# Patient Record
Sex: MALE | Race: WHITE | Employment: FULL TIME | URBAN - METROPOLITAN AREA
[De-identification: names, ages, dates, MRNs, and addresses within clinical notes are randomized per-mention and may not be internally consistent; named-entity substitution may affect disease eponyms.]

---

## 2017-01-04 ENCOUNTER — HOSPITAL ENCOUNTER (OUTPATIENT)
Dept: PHYSICAL THERAPY | Age: 32
Discharge: HOME | End: 2017-01-04
Payer: COMMERCIAL

## 2017-01-04 PROCEDURE — 97530 THERAPEUTIC ACTIVITIES: CPT

## 2017-01-04 PROCEDURE — 97110 THERAPEUTIC EXERCISES: CPT

## 2017-01-04 NOTE — PROGRESS NOTES
PHYSICAL THERAPY - DAILY TREATMENT NOTE    Patient Name: Jose Luis Flores        Date: 2017  : 1985   yes Patient  Verified  Visit #:     Insurance: Payor: BLUE CROSS / Plan: [employer] [facility] [place] / Product Type: PPO /      In time: 440 Out time: 540   Total Treatment Time: 60   TREATMENT AREA =  Left knee pain [M25.562]    SUBJECTIVE  Pain Level (on 0 to 10 scale):  0  / 10   Medication Changes/New allergies or changes in medical history, any new surgeries or procedures?    no  If yes, update Summary List   Subjective Functional Status/Changes:  []  No changes reported     \"I havent had p! in 2 weeks, it feels good. I did get sick last week, and had a break since the holidays.  I guess I got weaker\"         OBJECTIVE  35 min Therapeutic Exercise:  [x]  See flow sheet   Rationale:      increase ROM, increase strength, improve coordination, improve balance and increase proprioception to improve the patients ability to  perform ADLs/prolong stding and amb/stairs, and return to PLOF and avocational activities     25 min Therapeutic Activity: wall squats to 45 degs, squats to 45 with B LE and SL on TG, SLS on foam x 30\"   HRs for push off and TRs for ankle HS during gait  stair training, pull/push 50# x 60' x 4   Rationale:    increase ROM, increase strength, improve coordination, improve balance and increase proprioception to improve the patients ability to  perform ADLs/prolong stding and amb/stairs with ease          min Patient Education:  yes  Reviewed HEP   []  Progressed/Changed HEP based on:  Pt ed on importance and benefits of compliance with HEP, core strength/stability and proper posture; pt verbalized understanding         Other Objective/Functional Measures:    VCs + demo to perform proper technique for TE  initiated foam to SLS, and hold with wall squat to 45 degs with GTB without p!  pt demos decrease quad activation and L LE shaking with bosu step up; held at this time  L knee flex AROM=141 degs  demos good form with bridges     Post Treatment Pain Level (on 0 to 10) scale:   0  / 10     ASSESSMENT  Assessment/Changes in Function:   See PN    Progressed there-ex without c/o increase p!  demos proper 45 degs wall squat   []  See Progress Note/Recertification   Patient will continue to benefit from skilled PT services to modify and progress therapeutic interventions, address functional mobility deficits, address ROM deficits, address strength deficits, analyze and address soft tissue restrictions, analyze and cue movement patterns, analyze and modify body mechanics/ergonomics, assess and modify postural abnormalities and instruct in home and community integration to attain remaining goals. Progress toward goals / Updated goals:  See PN    · Short Term Goals: To be accomplished in 3 weeks:  1. Patient will be Ind with HEP for home management of symptoms. MET  2. Patient will report decreased pain to 2/10 at worst to improve ability to perform ADLs. MET  3. Patient will improve L knee AROM to 0-135 degrees to improve ability to perform functional transfers. MET, L knee AROM tzai=512 degs  · Long Term Goals: To be accomplished in 12 weeks:  1. Patient will improve FOTO score by 20 points to indicate improved functional ability  2. Patient will achieve Hop test to >/= 75% of RLE.   3. Patient will be able to run for 10 minutes without pain to improve ability to perform recreational activities     PLAN  []  Upgrade activities as tolerated yes Continue plan of care   []  Discharge due to :    []  Other:      Therapist: Nav De La Garza PTA    Date: 1/4/2017 Time: 5:17 PM     Future Appointments  Date Time Provider Sarahy Lopez   1/11/2017 3:00 PM [facility]   1/13/2017 11:30 AM [facility]   1/18/2017 4:30 PM [facility]   1/25/2017 3:00 PM [facility]   1/27/2017 11:30 AM [facility]

## 2017-01-04 NOTE — PROGRESS NOTES
[address] [facility] PHYSICAL THERAPY  [address] [place] [address] Reuben Norwood, [address] - Phone: [phone]  Fax: [phone]  PROGRESS NOTE  Patient Name: Jojo Newton : 1985   Treatment/Medical Diagnosis: Left knee pain [M25.562]   Referral Source: New Hernandez MD     Date of Initial Visit: 16 Attended Visits: 7 Missed Visits: 0     SUMMARY OF TREATMENT  Pt's rx consist of an active warm up on bike, LE strengthening/stability/stretching TE, proprioception/balance TE, stair training, and instruction on HEP + proper body mechanics  CURRENT STATUS  Pt has made excellent progress as evidence by achieving all STGs. Pt reports compliance + Larue with HEP, performs all there-ex p! free, and reports p! free x 2 weeks. Pt able to perform 45 degs squat with good quad activation. Pt's L knee 0-141 degs AROM (IE=0-125 degs). Goal/Measure of Progress Goal Met? 1. Patient will be Ind with HEP for home management of symptoms. MET    Status at last Eval: establish HEP  Current Status: (I) with HEP yes   2. Patient will report decreased pain to 2/10 at worst to improve ability to perform ADLs. Status at last Eval: >2/10 Current Status: p! free x 2 weeks yes   3. Patient will improve L knee AROM to 0-135 degrees to improve ability to perform functional transfers. Status at last Eval: L knee AROM flex=125 degs Current Status: L knee AROM flex=141 degs yes     New Goals to be achieved in __8__  weeks:  1. Patient will improve FOTO score by 20 points to indicate improved functional ability  2. Patient will achieve Hop test to >/= 75% of RLE. 3. Patient will be able to run for 10 minutes without pain to improve ability to perform recreational activities    RECOMMENDATIONS  Pt will benefit from further skilled PT services 2x wk x 4-8 wks to increase strength/stability to promote functional mobility and return to PLOF.     If you have any questions/comments please contact us directly at [phone]. Thank you for allowing us to assist in the care of your patient. LPTA Signature: Sharyle Mosher, PTA  Date: 1/4/2017   PT Signature: Carlota Gottron, DPT Time: 6:03 PM   NOTE TO PHYSICIAN:  PLEASE COMPLETE THE ORDERS BELOW AND FAX TO   [facility] [facility]: [phone]. If you are unable to process this request in 24 hours please contact our office: [phone].    ___ I have read the above report and request that my patient continue as recommended.   ___ I have read the above report and request that my patient continue therapy with the following changes/special instructions:_________________________________________________________   ___ I have read the above report and request that my patient be discharged from therapy.      Physician Signature:        Date:       Time:

## 2017-01-11 ENCOUNTER — HOSPITAL ENCOUNTER (OUTPATIENT)
Dept: PHYSICAL THERAPY | Age: 32
Discharge: HOME | End: 2017-01-11
Payer: COMMERCIAL

## 2017-01-11 PROCEDURE — 97110 THERAPEUTIC EXERCISES: CPT

## 2017-01-11 PROCEDURE — 97530 THERAPEUTIC ACTIVITIES: CPT

## 2017-01-13 ENCOUNTER — HOSPITAL ENCOUNTER (OUTPATIENT)
Dept: PHYSICAL THERAPY | Age: 32
Discharge: HOME | End: 2017-01-13
Payer: COMMERCIAL

## 2017-01-13 PROCEDURE — 97110 THERAPEUTIC EXERCISES: CPT

## 2017-01-13 PROCEDURE — 97530 THERAPEUTIC ACTIVITIES: CPT

## 2017-01-13 NOTE — PROGRESS NOTES
PHYSICAL THERAPY - DAILY TREATMENT NOTE    Patient Name: Matthew Bailey        Date: 2017  : 1985   yes Patient  Verified  Visit #:     Insurance: Payor: BLUE CROSS / Plan: [facility] / Product Type: PPO /      In time: 1130 Out time: 1225   Total Treatment Time: 55   TREATMENT AREA =  Left knee pain [M25.562]    SUBJECTIVE  Pain Level (on 0 to 10 scale):  0  / 10   Medication Changes/New allergies or changes in medical history, any new surgeries or procedures?    no  If yes, update Summary List   Subjective Functional Status/Changes:  []  No changes reported     \"I feel good today, I was a little sore after last time\"       OBJECTIVE  40 min Therapeutic Exercise:  [x]  See flow sheet   Rationale:      increase ROM, increase strength, improve coordination, improve balance and increase proprioception to improve the patients ability to  perform ADLs/prolong stding and amb/stairs, and return to PLOF and avocational activities     15 min Therapeutic Activity:  squats, bosu steps ups, and lunges,      Rationale:    increase ROM, increase strength, improve coordination, improve balance and increase proprioception to improve the patients ability to  perform ADLs/prolong stding and amb/stairs with ease          min Patient Education:  yes  Reviewed HEP   []  Progressed/Changed HEP based on:  Pt ed on importance and benefits of compliance with HEP, core strength/stability and proper posture; pt verbalized understanding     Other Objective/Functional Measures:     VCs + demo to perform proper technique for TE  initiated  bosu lunge, and stool scoot without c/o p!  demos decrease knee flex with lunge due to decrease strength    demos good+ bridge form with SB bridges    demos good quad activation with step ups ecc lowering on bosu   Post Treatment Pain Level (on 0 to 10) scale:   0  / 10     ASSESSMENT  Assessment/Changes in Function:     Progressed there-ex without c/o increase p!      [] See Progress Note/Recertification   Patient will continue to benefit from skilled PT services to modify and progress therapeutic interventions, address functional mobility deficits, address ROM deficits, address strength deficits, analyze and address soft tissue restrictions, analyze and cue movement patterns, analyze and modify body mechanics/ergonomics, assess and modify postural abnormalities and instruct in home and community integration to attain remaining goals. Progress toward goals / Updated goals:  See PN last visit    · Short Term Goals: To be accomplished in 3 weeks:  1. Patient will be Ind with HEP for home management of symptoms. MET  2. Patient will report decreased pain to 2/10 at worst to improve ability to perform ADLs. MET  3. Patient will improve L knee AROM to 0-135 degrees to improve ability to perform functional transfers. MET, L knee AROM jhic=999 degs  · Long Term Goals: To be accomplished in 12 weeks:  1. Patient will improve FOTO score by 20 points to indicate improved functional ability  2. Patient will achieve Hop test to >/= 75% of RLE.   3. Patient will be able to run for 10 minutes without pain to improve ability to perform recreational activities     PLAN  []  Upgrade activities as tolerated yes Continue plan of care   []  Discharge due to :    []  Other:      Therapist: Dave Puentes PTA    Date: 1/13/2017 Time: 3:45 PM     Future Appointments  Date Time Provider Sarahy Lopez   1/18/2017 4:30 PM [facility]   1/25/2017 3:00 PM [facility]   1/27/2017 11:30 AM Guanaco Melchor [facility]

## 2017-01-18 ENCOUNTER — HOSPITAL ENCOUNTER (OUTPATIENT)
Dept: PHYSICAL THERAPY | Age: 32
Discharge: HOME | End: 2017-01-18
Payer: COMMERCIAL

## 2017-01-18 PROCEDURE — 97110 THERAPEUTIC EXERCISES: CPT

## 2017-01-18 PROCEDURE — 97530 THERAPEUTIC ACTIVITIES: CPT

## 2017-01-18 NOTE — PROGRESS NOTES
PHYSICAL THERAPY - DAILY TREATMENT NOTE    Patient Name: Luz Huang        Date: 2017  : 1985   yes Patient  Verified  Visit #:   10 of   20  Insurance: Payor: Delphine Macias / Plan: [address] [facility] [employer] / Product Type: PPO /      In time: 430 Out time: 525   Total Treatment Time: 55   TREATMENT AREA =  Left knee pain [M25.562]    SUBJECTIVE  Pain Level (on 0 to 10 scale):  0  / 10   Medication Changes/New allergies or changes in medical history, any new surgeries or procedures?    no  If yes, update Summary List   Subjective Functional Status/Changes:  []  No changes reported     \"I been doing my butt burners and It feels good\"       OBJECTIVE  40 min Therapeutic Exercise:  [x]  See flow sheet   Rationale:      increase ROM, increase strength, improve coordination, improve balance and increase proprioception to improve the patients ability to  perform ADLs/prolong stding and amb/stairs, and return to PLOF and avocational activities     15 min Therapeutic Activity:  squats, bosu steps ups, and lunges      Rationale:    increase ROM, increase strength, improve coordination, improve balance and increase proprioception to improve the patients ability to  perform ADLs/prolong stding and amb/stairs with ease          min Patient Education:  yes  Reviewed HEP   []  Progressed/Changed HEP based on:  Pt ed on importance and benefits of compliance with HEP, core strength/stability and proper posture; pt verbalized understanding     Other Objective/Functional Measures:     VCs + demo to perform proper technique for TE  initiated  bosu squats, prone knee flex, inv HS and added 2# to butt burners without c/o p!    attempted tap downs on 4\", pt unable due to decrease strength and p! Post Treatment Pain Level (on 0 to 10) scale:   0  / 10     ASSESSMENT  Assessment/Changes in Function:   demos good quad activation with squats  Progressed there-ex without c/o increase p!      []  See Progress Note/Recertification   Patient will continue to benefit from skilled PT services to modify and progress therapeutic interventions, address functional mobility deficits, address ROM deficits, address strength deficits, analyze and address soft tissue restrictions, analyze and cue movement patterns, analyze and modify body mechanics/ergonomics, assess and modify postural abnormalities and instruct in home and community integration to attain remaining goals. Progress toward goals / Updated goals:  Short Term Goals: To be accomplished in 3 weeks:  1. Patient will be Ind with HEP for home management of symptoms. MET  2. Patient will report decreased pain to 2/10 at worst to improve ability to perform ADLs. MET  3. Patient will improve L knee AROM to 0-135 degrees to improve ability to perform functional transfers. MET, L knee AROM aoqg=919 degs  · Long Term Goals: To be accomplished in 12 weeks:  1. Patient will improve FOTO score by 20 points to indicate improved functional ability  2. Patient will achieve Hop test to >/= 75% of RLE.   3. Patient will be able to run for 10 minutes without pain to improve ability to perform recreational activities     PLAN  []  Upgrade activities as tolerated yes Continue plan of care   []  Discharge due to :    []  Other:      Therapist: Marylou Boyle PTA    Date: 1/18/2017 Time: 5:18 PM     Future Appointments  Date Time Provider Sarahy Lpoez   1/25/2017 3:00 PM [facility]   1/27/2017 11:30 AM Serafin Naik [facility]

## 2017-01-25 ENCOUNTER — APPOINTMENT (OUTPATIENT)
Dept: PHYSICAL THERAPY | Age: 32
End: 2017-01-25
Payer: COMMERCIAL

## 2017-01-27 ENCOUNTER — HOSPITAL ENCOUNTER (OUTPATIENT)
Dept: PHYSICAL THERAPY | Age: 32
Discharge: HOME | End: 2017-01-27
Payer: COMMERCIAL

## 2017-01-27 PROCEDURE — 97110 THERAPEUTIC EXERCISES: CPT

## 2017-01-27 PROCEDURE — 97112 NEUROMUSCULAR REEDUCATION: CPT

## 2017-02-01 ENCOUNTER — HOSPITAL ENCOUNTER (OUTPATIENT)
Dept: PHYSICAL THERAPY | Age: 32
Discharge: HOME | End: 2017-02-01
Payer: COMMERCIAL

## 2017-02-01 PROCEDURE — 97110 THERAPEUTIC EXERCISES: CPT

## 2017-02-01 PROCEDURE — 97530 THERAPEUTIC ACTIVITIES: CPT

## 2017-02-01 NOTE — PROGRESS NOTES
PHYSICAL THERAPY - DAILY TREATMENT NOTE    Patient Name: Trish Marshall        Date: 2017  : 1985   yes Patient  Verified  Visit #:     Insurance: Payor: Gerardo Chester / Plan: [facility] / Product Type: PPO /      In time: 430 Out time: 525   Total Treatment Time: 55   TREATMENT AREA =  Left knee pain [M25.562]    SUBJECTIVE  Pain Level (on 0 to 10 scale):  0  / 10   Medication Changes/New allergies or changes in medical history, any new surgeries or procedures?    no  If yes, update Summary List   Subjective Functional Status/Changes:  []  No changes reported     \"I read doing research that I need to strengthen my HS and keep them as strong as my quads\"       OBJECTIVE  45 min Therapeutic Exercise:  [x]  See flow sheet   Rationale:      increase ROM, increase strength, improve coordination, improve balance and increase proprioception to improve the patients ability to  perform ADLs/prolong stding and amb/stairs, and return to PLOF and avocational activities     10 min Therapeutic Activity:  squats  stair stepper    Rationale:    increase ROM, increase strength, improve coordination, improve balance and increase proprioception to improve the patients ability to  perform ADLs/prolong stding and amb/stairs with ease          min Patient Education:  yes  Reviewed HEP   []  Progressed/Changed HEP based on:  Pt ed on importance and benefits of compliance with HEP, core strength/stability and proper posture; pt verbalized understanding     Other Objective/Functional Measures:     VCs + demo to perform proper technique for TE  initiated WGS #1, #2, big X littl X, and SL stool scoot without c/o p!  demos quad fatigue with 4\" tap down  SLS without LOB  demos 90% AROM  L SL HR    Post Treatment Pain Level (on 0 to 10) scale:   0  / 10     ASSESSMENT  Assessment/Changes in Function:   demos proper wall squat at 90 degs x 30\">40\"  Progressed there-ex without c/o increase p!      []  See Progress Note/Recertification   Patient will continue to benefit from skilled PT services to modify and progress therapeutic interventions, address functional mobility deficits, address ROM deficits, address strength deficits, analyze and address soft tissue restrictions, analyze and cue movement patterns, analyze and modify body mechanics/ergonomics, assess and modify postural abnormalities and instruct in home and community integration to attain remaining goals. Progress toward goals / Updated goals:  Short Term Goals: To be accomplished in 3 weeks:  1. Patient will be Ind with HEP for home management of symptoms. MET  2. Patient will report decreased pain to 2/10 at worst to improve ability to perform ADLs. MET  3. Patient will improve L knee AROM to 0-135 degrees to improve ability to perform functional transfers. MET, L knee AROM rptv=600 degs  · Long Term Goals: To be accomplished in 12 weeks:  1. Patient will improve FOTO score by 20 points to indicate improved functional ability  2. Patient will achieve Hop test to >/= 75% of RLE.   3. Patient will be able to run for 10 minutes without pain to improve ability to perform recreational activities     PLAN  []  Upgrade activities as tolerated yes Continue plan of care   []  Discharge due to :    []  Other:      Therapist: Abdelrahman Shaikh PTA    Date: 2/1/2017 Time: 5:30 PM     Future Appointments  Date Time Provider Sarahy Lopez   2/8/2017 4:30 PM [facility]   2/10/2017 9:30 AM [facility]   2/15/2017 4:30 PM [facility]   2/22/2017 4:30 PM [facility]   2/24/2017 11:00 AM Tracy Garcia [facility]

## 2017-02-08 ENCOUNTER — APPOINTMENT (OUTPATIENT)
Dept: PHYSICAL THERAPY | Age: 32
End: 2017-02-08
Payer: COMMERCIAL

## 2017-02-13 ENCOUNTER — HOSPITAL ENCOUNTER (OUTPATIENT)
Dept: PHYSICAL THERAPY | Age: 32
Discharge: HOME | End: 2017-02-13
Payer: COMMERCIAL

## 2017-02-13 PROCEDURE — 97110 THERAPEUTIC EXERCISES: CPT

## 2017-02-13 PROCEDURE — 97530 THERAPEUTIC ACTIVITIES: CPT

## 2017-02-13 NOTE — PROGRESS NOTES
PHYSICAL THERAPY - DAILY TREATMENT NOTE    Patient Name: Vaibhav Urrutia        Date: 2017  : 1985   yes Patient  Verified  Visit #:   15 of   20  Insurance: Payor: Yana Richardconstantino / Plan: [address] [facility] [employer] / Product Type: PPO /      In time: 130 Out time: 230   Total Treatment Time: 60   TREATMENT AREA =  Left knee pain [M25.562]    SUBJECTIVE  Pain Level (on 0 to 10 scale):  0  / 10   Medication Changes/New allergies or changes in medical history, any new surgeries or procedures?    no  If yes, update Summary List   Subjective Functional Status/Changes:  []  No changes reported     \"I been working on my tap downs at my work after I walk up the stairs and I road 15 miles Sat. \"       OBJECTIVE  50 min Therapeutic Exercise:  [x]  See flow sheet   Rationale:      increase ROM, increase strength, improve coordination, improve balance and increase proprioception to improve the patients ability to  perform ADLs/prolong stding and amb/stairs, and return to PLOF and avocational activities     10 min Therapeutic Activity:  squats  SLS on foam  stair stepper for strength/stability to perform  work duties    Rationale:    increase ROM, increase strength, improve coordination, improve balance and increase proprioception to improve the patients ability to  perform ADLs/prolong stding and amb/stairs with ease          min Patient Education:  yes  Reviewed HEP   []  Progressed/Changed HEP based on:  Pt ed on importance and benefits of compliance with HEP, core strength/stability and proper posture; pt verbalized understanding     Other Objective/Functional Measures:     VCs + demo to perform proper technique for TE    initiated L SLS cone tap x 2 + foam x 3 with LOB x 2 with (I) to recover    c/o fatigue after rx  demos fair quad form with lat tap down on 6\"     Post Treatment Pain Level (on 0 to 10) scale:   0  / 10     ASSESSMENT  Assessment/Changes in Function:   c/o fatigue after rx  demos fair quad form with lat tap down on 6\"  Progressed there-ex without c/o increase p! []  See Progress Note/Recertification   Patient will continue to benefit from skilled PT services to modify and progress therapeutic interventions, address functional mobility deficits, address ROM deficits, address strength deficits, analyze and address soft tissue restrictions, analyze and cue movement patterns, analyze and modify body mechanics/ergonomics, assess and modify postural abnormalities and instruct in home and community integration to attain remaining goals. Progress toward goals / Updated goals:  Short Term Goals: To be accomplished in 3 weeks:  1. Patient will be Ind with HEP for home management of symptoms. MET  2. Patient will report decreased pain to 2/10 at worst to improve ability to perform ADLs. MET  3. Patient will improve L knee AROM to 0-135 degrees to improve ability to perform functional transfers. MET, L knee AROM bekp=430 degs  · Long Term Goals: To be accomplished in 12 weeks:  1. Patient will improve FOTO score by 20 points to indicate improved functional ability  2. Patient will achieve Hop test to >/= 75% of RLE.   3. Patient will be able to run for 10 minutes without pain to improve ability to perform recreational activities     PLAN  []  Upgrade activities as tolerated yes Continue plan of care   []  Discharge due to :    []  Other:      Therapist: Ian Lorenzo PTA    Date: 2/13/2017 Time: 4:32 PM     Future Appointments  Date Time Provider Sarahy Lopez   2/15/2017 4:30 PM [facility]   2/22/2017 4:30 PM [facility]   2/24/2017 11:00 AM Jessica Kaufman [facility]

## 2017-02-15 ENCOUNTER — HOSPITAL ENCOUNTER (OUTPATIENT)
Dept: PHYSICAL THERAPY | Age: 32
Discharge: HOME | End: 2017-02-15
Payer: COMMERCIAL

## 2017-02-15 PROCEDURE — 97530 THERAPEUTIC ACTIVITIES: CPT

## 2017-02-15 PROCEDURE — 97110 THERAPEUTIC EXERCISES: CPT

## 2017-02-16 NOTE — PROGRESS NOTES
PHYSICAL THERAPY - DAILY TREATMENT NOTE    Patient Name: Jose Luis Flores        Date: 2/15/2017  : 1985   yes Patient  Verified  Visit #:   15 of   20  Insurance: Payor: BLUE CROSS / Plan: [facility] / Product Type: PPO /      In time: 435 Out time: 535   Total Treatment Time: 60   TREATMENT AREA =  Left knee pain [M25.562]    SUBJECTIVE  Pain Level (on 0 to 10 scale):  0  / 10   Medication Changes/New allergies or changes in medical history, any new surgeries or procedures?    no  If yes, update Summary List   Subjective Functional Status/Changes:  []  No changes reported     \"I really want to get back to running. \"       OBJECTIVE  35 min Therapeutic Exercise:  [x]  See flow sheet   Rationale:      increase ROM, increase strength, improve coordination, improve balance and increase proprioception to improve the patients ability to  perform ADLs/prolong stding and amb/stairs, and return to PLOF and avocational activities     25 min Therapeutic Activity:  squats  SLS on DD  SL HRs  stair stepper for strength/stability to perform  work duties  stair training on bosu    Rationale:    increase ROM, increase strength, improve coordination, improve balance and increase proprioception to improve the patients ability to  perform ADLs/prolong stding and amb/stairs with ease          min Patient Education:  yes  Reviewed HEP   []  Progressed/Changed HEP based on:  Pt ed on importance and benefits of compliance with HEP, core strength/stability and proper posture; pt verbalized understanding     Other Objective/Functional Measures:     VCs + demo to perform proper technique for TE    initiated L SLS on DD with 1 finger support at times; and reverse lunge on bosu x 5, reports quad fatigue  discussed continuing PT 2x wk x 4 wks to promote PLOF    demos fair+ quad form with lat tap down on 6\"     Post Treatment Pain Level (on 0 to 10) scale:   0  / 10     ASSESSMENT  Assessment/Changes in Function: demos fair+ quad form with lat tap down on 6\"  Progressed there-ex without c/o increase p! []  See Progress Note/Recertification   Patient will continue to benefit from skilled PT services to modify and progress therapeutic interventions, address functional mobility deficits, address ROM deficits, address strength deficits, analyze and address soft tissue restrictions, analyze and cue movement patterns, analyze and modify body mechanics/ergonomics, assess and modify postural abnormalities and instruct in home and community integration to attain remaining goals. Progress toward goals / Updated goals:   See PN    Short Term Goals: To be accomplished in 3 weeks:  1. Patient will be Ind with HEP for home management of symptoms. MET  2. Patient will report decreased pain to 2/10 at worst to improve ability to perform ADLs. MET  3. Patient will improve L knee AROM to 0-135 degrees to improve ability to perform functional transfers. MET, L knee AROM mbag=074 degs  · Long Term Goals: To be accomplished in 12 weeks:  1. Patient will improve FOTO score by 20 points to indicate improved functional ability. progressing, 68  2. Patient will achieve Hop test to >/= 75% of RLE. NT  3. Patient will be able to run for 10 minutes without pain to improve ability to perform recreational activities.  Initiate run:jog NV     PLAN  []  Upgrade activities as tolerated yes Continue plan of care   []  Discharge due to :    []  Other: initiated run:jog program NV     Therapist: Rivka Chaney PTA    Date: 2/15/2017 Time: 7:11 PM     Future Appointments  Date Time Provider Sarahy Lopez   2/22/2017 4:30 PM [facility]   2/24/2017 11:00 AM [facility]   3/1/2017 4:30 PM [facility]   3/8/2017 4:30 PM [facility]   3/10/2017 11:00 AM [facility]   3/15/2017 4:30 PM [facility]   3/22/2017 4:30 PM [facility] 3/24/2017 11:00 AM [facility]   3/29/2017 4:30 PM [facility]

## 2017-02-16 NOTE — PROGRESS NOTES
[address] [facility] PHYSICAL THERAPY  [address], [address] - Phone: [phone]  Fax: [phone]  PROGRESS NOTE  Patient Name: Kang Chang : 1985   Treatment/Medical Diagnosis: Left knee pain [M25.562]   Referral Source: Didier Trammell MD     Date of Initial Visit: 16 Attended Visits: 7 Missed Visits: 0     SUMMARY OF TREATMENT  Pt's rx consist of an active warm up on stair master, LE/core strengthening/stability/stretching TE, proprioception/balance TE, stair training, and instruction on HEP + proper body mechanics  CURRENT STATUS  Pt continues to progress as evidence by the following. Pt compliant with HEP and is able to demo fair+ quad activation with 6\" lat tap down. Pt reports ability to ride 15 miles on bicycle without increase p!. Pt has not initiated walk:jog:run at this time due to min decrease in quad strength. Pt demos proper p! free squat at ~ 80 degs without valgus or anterior translation. Pt's FOTO=68 (IE=57, goal= 77);  indicating improved functional mobility  Goal/Measure of Progress Goal Met? 1. Patient will improve FOTO score by 20 points to indicate improved functional ability    Status at last Eval: 57 Current Status: 68 progressing   2. Patient will achieve Hop test to >/= 75% of RLE. Status at last Eval: NT Current Status: NT NA   3. Patient will be able to run for 10 minutes without pain to improve ability to perform recreational activities   Status at last Eval: NT Current Status: NT NA     New Goals to be achieved in __8__  weeks:  1. Patient will improve FOTO score by 20 points to indicate improved functional ability  2. Patient will achieve Hop test to >/= 75% of RLE.   3. Patient will be able to run for 10 minutes without pain to improve ability to perform recreational activities    RECOMMENDATIONS  Pt to initiate walk:jog:run and to initiate ambriz jumping program; therefore will benefit from further skilled PT services 2x wk x 4-8 wks to increase strength/stability to promote functional mobility and return to PLOF. If you have any questions/comments please contact us directly at [phone]. Thank you for allowing us to assist in the care of your patient. LPTA Signature: Almas Robert PTA  Date: 2/15/2017   PT Signature: Dav Petersen DPT Time: 6:03 PM   NOTE TO PHYSICIAN:  PLEASE COMPLETE THE ORDERS BELOW AND FAX TO   [facility]: [phone]. If you are unable to process this request in 24 hours please contact our office: [phone].    ___ I have read the above report and request that my patient continue as recommended.   ___ I have read the above report and request that my patient continue therapy with the following changes/special instructions:_________________________________________________________   ___ I have read the above report and request that my patient be discharged from therapy.      Physician Signature:        Date:       Time:

## 2017-02-22 ENCOUNTER — HOSPITAL ENCOUNTER (OUTPATIENT)
Dept: PHYSICAL THERAPY | Age: 32
Discharge: HOME | End: 2017-02-22
Payer: COMMERCIAL

## 2017-02-22 PROCEDURE — 97110 THERAPEUTIC EXERCISES: CPT

## 2017-02-22 PROCEDURE — 97530 THERAPEUTIC ACTIVITIES: CPT

## 2017-02-22 NOTE — PROGRESS NOTES
PHYSICAL THERAPY - DAILY TREATMENT NOTE    Patient Name: Heather Ravi        Date: 2017  : 1985   yes Patient  Verified  Visit #:      20  Insurance: Payor: BLUE CROSS / Plan: [facility] / Product Type: PPO /      In time: 435 Out time: 530   Total Treatment Time: 55   TREATMENT AREA =  Left knee pain [M25.562]    SUBJECTIVE  Pain Level (on 0 to 10 scale):  0  / 10   Medication Changes/New allergies or changes in medical history, any new surgeries or procedures?    no  If yes, update Summary List   Subjective Functional Status/Changes:  []  No changes reported     \"I road 20 miles this weekend, and it was up hills too . \"       OBJECTIVE  30 min Therapeutic Exercise:  [x]  See flow sheet   Rationale:      increase ROM, increase strength, improve coordination, improve balance and increase proprioception to improve the patients ability to  perform ADLs/prolong stding and amb/stairs, and return to PLOF and avocational activities     25 min Therapeutic Activity:  squats  SLS on foam with cone taps  SL HRs  walk:jog x 5' 1:1   Rationale:    increase ROM, increase strength, improve coordination, improve balance and increase proprioception to improve the patients ability to  perform ADLs/prolong stding and amb/stairs with ease          min Patient Education:  yes  Reviewed HEP   []  Progressed/Changed HEP based on:  Pt ed on importance and benefits of compliance with HEP, core strength/stability and proper posture; pt verbalized understanding  See updated HEP in chart       Other Objective/Functional Measures:     VCs + demo to perform proper technique for TE    initiated walk:jog 1:1 x 5 mins with Vcs to increase softer landing and increase L LE stance time    initiated RDLs with 20# KB B UE; and step up + knee drive with a lunge step back, dynamic HS str with BS without c/o p!        Post Treatment Pain Level (on 0 to 10) scale:   0  / 10     ASSESSMENT  Assessment/Changes in Function: Progressed there-ex without c/o increase p! []  See Progress Note/Recertification   Patient will continue to benefit from skilled PT services to modify and progress therapeutic interventions, address functional mobility deficits, address ROM deficits, address strength deficits, analyze and address soft tissue restrictions, analyze and cue movement patterns, analyze and modify body mechanics/ergonomics, assess and modify postural abnormalities and instruct in home and community integration to attain remaining goals. Progress toward goals / Updated goals:   See PN last visit     Short Term Goals: To be accomplished in 3 weeks:  1. Patient will be Ind with HEP for home management of symptoms. MET  2. Patient will report decreased pain to 2/10 at worst to improve ability to perform ADLs. MET  3. Patient will improve L knee AROM to 0-135 degrees to improve ability to perform functional transfers. MET, L knee AROM qswz=137 degs  · Long Term Goals: To be accomplished in 12 weeks:  1. Patient will improve FOTO score by 20 points to indicate improved functional ability. progressing, 68  2. Patient will achieve Hop test to >/= 75% of RLE. NT  3. Patient will be able to run for 10 minutes without pain to improve ability to perform recreational activities.  Initiate run:jog NV     PLAN  []  Upgrade activities as tolerated yes Continue plan of care   []  Discharge due to :    []  Other:      Therapist: Kiran Greco PTA    Date: 2/22/2017 Time: 4:44 PM     Future Appointments  Date Time Provider Sarahy Lopez   2/24/2017 11:00 AM [facility]   3/1/2017 4:30 PM [facility]   3/8/2017 4:30 PM [facility]   3/10/2017 11:00 AM [facility]   3/15/2017 4:30 PM [facility]   3/22/2017 4:30 PM [facility]   3/24/2017 11:00 AM [facility]   3/29/2017 4:30 PM [facility]

## 2017-02-24 ENCOUNTER — HOSPITAL ENCOUNTER (OUTPATIENT)
Dept: PHYSICAL THERAPY | Age: 32
Discharge: HOME | End: 2017-02-24
Payer: COMMERCIAL

## 2017-02-24 PROCEDURE — 97110 THERAPEUTIC EXERCISES: CPT

## 2017-02-24 PROCEDURE — 97112 NEUROMUSCULAR REEDUCATION: CPT

## 2017-02-24 PROCEDURE — 97530 THERAPEUTIC ACTIVITIES: CPT

## 2017-02-24 NOTE — PROGRESS NOTES
PHYSICAL THERAPY - DAILY TREATMENT NOTE      Patient Name: Gil Solis        Date: 2017  : 1985   YES Patient  Verified  Visit #:     Insurance: Payor: Zoran May / Plan:  [facility] / Product Type: PPO /      In time: 11:00 Out time: 11:50   Total Treatment Time: 50 min       TREATMENT AREA =  Left knee pain [M25.562]    SUBJECTIVE    Pain Level (on 0 to 10 scale):  0  / 10   Medication Changes/New allergies or changes in medical history, any new surgeries or procedures? NO    If yes, update Summary List   Subjective Functional Status/Changes:  []  No changes reported     \"Keyana really kicked my butt. \"        OBJECTIVE    25 min Therapeutic Exercise:  [x]  See flow sheet   Rationale:      increase ROM, increase strength, improve coordination, improve balance and increase proprioception to improve the patients ability to increase pt's stability/mobility and improve functional activity and ability to perform ADL's    10 min [employer] squats, BOSU lunges, SLS cone taps on foam       15 min Therapeutic Activity: SL HRs  Lunges forward/backward  walk:jog x 10' 1:1   Rationale:     decrease pain, increase ROM, increase tissue extensibility and increase postural awareness to improve patient's ability to return to running with proper form, improve squat mechanics. 1 min Patient Education:  YES  Reviewed HEP   []  Progressed/Changed HEP based on: Other Objective/Functional Measures:    Progressed Walk:Jog x 10 min 1:1 ratio at Speed 2.5 / 5.3     Demo required for forward/backward lunges at step; these performed bilaterally     Post Treatment Pain Level (on 0 to 10) scale:   0  / 10     ASSESSMENT    Assessment/Changes in Function:     Some hesitancy noted and favoring of LLE with slight dec terminal knee ext during TM jogging but no inc in pain.  Tolerating progression of SLS exercises very well.     []  See Progress Note/Recertification   Patient will continue to benefit from skilled PT services to modify and progress therapeutic interventions, address functional mobility deficits, address ROM deficits, address strength deficits, analyze and address soft tissue restrictions, analyze and cue movement patterns, analyze and modify body mechanics/ergonomics, assess and modify postural abnormalities, address imbalance/dizziness and instruct in home and community integration to attain remaining goals. Progress toward goals / Updated goals:    Short Term Goals: To be accomplished in 3 weeks:  1. Patient will be Ind with HEP for home management of symptoms. MET  2. Patient will report decreased pain to 2/10 at worst to improve ability to perform ADLs. MET  3. Patient will improve L knee AROM to 0-135 degrees to improve ability to perform functional transfers. MET, L knee AROM kthx=416 degs  · Long Term Goals: To be accomplished in 12 weeks:  1. Patient will improve FOTO score by 20 points to indicate improved functional ability. progressing, 68  2. Patient will achieve Hop test to >/= 75% of RLE. NT  3. Patient will be able to run for 10 minutes without pain to improve ability to perform recreational activities.  Initiate run:jog NV     PLAN    [x]  Upgrade activities as tolerated YES Continue plan of care   []  Discharge due to :    []  Other:      Therapist: Marv Burt DPT    Date: 2/24/2017 Time: 10:34 AM     Future Appointments  Date Time Provider Sarahy Lopez   2/24/2017 11:00 AM [facility]   3/1/2017 4:30 PM [facility]   3/3/2017 8:30 AM [facility]   3/15/2017 4:30 PM [facility]   3/22/2017 4:30 PM [facility]   3/24/2017 11:00 AM [facility]   3/29/2017 4:30 PM [facility]

## 2017-03-01 ENCOUNTER — APPOINTMENT (OUTPATIENT)
Dept: PHYSICAL THERAPY | Age: 32
End: 2017-03-01
Payer: COMMERCIAL

## 2017-03-08 ENCOUNTER — APPOINTMENT (OUTPATIENT)
Dept: PHYSICAL THERAPY | Age: 32
End: 2017-03-08
Payer: COMMERCIAL

## 2017-03-10 ENCOUNTER — APPOINTMENT (OUTPATIENT)
Dept: PHYSICAL THERAPY | Age: 32
End: 2017-03-10
Payer: COMMERCIAL

## 2017-03-15 ENCOUNTER — HOSPITAL ENCOUNTER (OUTPATIENT)
Dept: PHYSICAL THERAPY | Age: 32
Discharge: HOME | End: 2017-03-15
Payer: COMMERCIAL

## 2017-03-15 PROCEDURE — 97530 THERAPEUTIC ACTIVITIES: CPT

## 2017-03-15 PROCEDURE — 97110 THERAPEUTIC EXERCISES: CPT

## 2017-03-15 NOTE — PROGRESS NOTES
PHYSICAL THERAPY - DAILY TREATMENT NOTE    Patient Name: Asher Mcclellan        Date: 3/15/2017  : 1985   yes Patient  Verified  Visit #:     Insurance: Payor: BLUE CROSS / Plan: [employer] [facility] / Product Type: PPO /      In time: 430 Out time: 525   Total Treatment Time: 55   TREATMENT AREA =  Left knee pain [M25.562]    SUBJECTIVE  Pain Level (on 0 to 10 scale):  0  / 10   Medication Changes/New allergies or changes in medical history, any new surgeries or procedures?    no  If yes, update Summary List   Subjective Functional Status/Changes:  []  No changes reported     \"I feel really good, really good. I was able to run on the TM for 10' a few times, and it feels good, no pain\"       OBJECTIVE  32 min Therapeutic Exercise:  [x]  See flow sheet   Rationale:      increase ROM, increase strength, improve coordination, improve balance and increase proprioception to improve the patients ability to  perform ADLs/prolong stding and amb/stairs, and return to PLOF and avocational activities     23 min Therapeutic Activity:  squats  SLS on foam with cone taps  SL HRs  hops/jumps   Rationale:    increase ROM, increase strength, improve coordination, improve balance and increase proprioception to improve the patients ability to  perform ADLs/prolong stding and amb/stairs with ease          min Patient Education:  yes  Reviewed HEP   []  Progressed/Changed HEP based on:  Pt ed on importance and benefits of compliance with HEP, core strength/stability and proper posture; pt verbalized understanding       Other Objective/Functional Measures:     VCs + demo to perform proper technique for TE    initiated line jumps F/L, squat jumps + 6# ball on wall, and increased to BTB with BIG X/Little X without c/o p!     VCs to decrease ant translation with squat jumps  demos good form with line jumps    Post Treatment Pain Level (on 0 to 10) scale:   0  / 10     ASSESSMENT  Assessment/Changes in Function: Progressed there-ex without c/o increase p! []  See Progress Note/Recertification   Patient will continue to benefit from skilled PT services to modify and progress therapeutic interventions, address functional mobility deficits, address ROM deficits, address strength deficits, analyze and address soft tissue restrictions, analyze and cue movement patterns, analyze and modify body mechanics/ergonomics, assess and modify postural abnormalities and instruct in home and community integration to attain remaining goals. Progress toward goals / Updated goals:   Short Term Goals: To be accomplished in 3 weeks:  1. Patient will be Ind with HEP for home management of symptoms. MET  2. Patient will report decreased pain to 2/10 at worst to improve ability to perform ADLs. MET  3. Patient will improve L knee AROM to 0-135 degrees to improve ability to perform functional transfers. MET, L knee AROM kxzl=744 degs  · Long Term Goals: To be accomplished in 12 weeks:  1. Patient will improve FOTO score by 20 points to indicate improved functional ability. progressing, 68  2. Patient will achieve Hop test to >/= 75% of RLE. NT  3. Patient will be able to run for 10 minutes without pain to improve ability to perform recreational activities. MET, running on TM x 10 mins without p!      PLAN  []  Upgrade activities as tolerated yes Continue plan of care   []  Discharge due to :    []  Other:      Therapist: Karissa Polo PTA    Date: 3/15/2017 Time: 4:44 PM     Future Appointments  Date Time Provider Sarahy Lopez   3/17/2017 8:30 AM Karissa Polo PTA [facility]   3/20/2017 8:30 AM Karissa Polo PTA [facility]   3/24/2017 11:00 AM [facility]   3/27/2017 8:00 AM Karissa Polo PTA [facility]   3/31/2017 8:30 AM Karissa Polo [facility]

## 2017-03-17 ENCOUNTER — HOSPITAL ENCOUNTER (OUTPATIENT)
Dept: PHYSICAL THERAPY | Age: 32
Discharge: HOME | End: 2017-03-17
Payer: COMMERCIAL

## 2017-03-17 PROCEDURE — 97110 THERAPEUTIC EXERCISES: CPT

## 2017-03-17 PROCEDURE — 97530 THERAPEUTIC ACTIVITIES: CPT

## 2017-03-17 NOTE — PROGRESS NOTES
PHYSICAL THERAPY - DAILY TREATMENT NOTE    Patient Name: Vaibhav Urrutia        Date: 3/17/2017  : 1985   yes Patient  Verified  Visit #:     Insurance: Payor: BLUE CROSS / Plan:  [facility] / Product Type: PPO /      In time: 830 Out time: 920   Total Treatment Time: 50   TREATMENT AREA =  Left knee pain [M25.562]    SUBJECTIVE  Pain Level (on 0 to 10 scale):  0  / 10   Medication Changes/New allergies or changes in medical history, any new surgeries or procedures?    no  If yes, update Summary List   Subjective Functional Status/Changes:  []  No changes reported     \"I am actually really good. It was not too bad after the new jumps last time\"     OBJECTIVE  35 min Therapeutic Exercise:  [x]  See flow sheet   Rationale:      increase ROM, increase strength, improve coordination, improve balance and increase proprioception to improve the patients ability to  perform ADLs/prolong stding and amb/stairs, and return to PLOF and avocational activities     15 min Therapeutic Activity:  squats  SLS on foam with cone taps  hops/jumps   Rationale:    increase ROM, increase strength, improve coordination, improve balance and increase proprioception to improve the patients ability to  perform ADLs/prolong stding and amb/stairs with ease          min Patient Education:  yes  Reviewed HEP   []  Progressed/Changed HEP based on:  Pt ed on importance and benefits of compliance with HEP, core strength/stability and proper posture; pt verbalized understanding       Other Objective/Functional Measures:     VCs + demo to perform proper technique for TE    initiated 25# with hip thrusters without c/o p!  fatigue with 3 reps of SLS cone taps; held last 2 reps  performed 3 way HS str  demos improved speed with line jumps    Post Treatment Pain Level (on 0 to 10) scale:   0  / 10     ASSESSMENT  Assessment/Changes in Function:     Progressed there-ex without c/o increase p!      []  See Progress Note/Recertification   Patient will continue to benefit from skilled PT services to modify and progress therapeutic interventions, address functional mobility deficits, address ROM deficits, address strength deficits, analyze and address soft tissue restrictions, analyze and cue movement patterns, analyze and modify body mechanics/ergonomics, assess and modify postural abnormalities and instruct in home and community integration to attain remaining goals. Progress toward goals / Updated goals:   See PN    · Long Term Goals: To be accomplished in 12 weeks:  1. Patient will improve FOTO score by 20 points to indicate improved functional ability. progressing, 68  2. Patient will achieve Hop test to >/= 75% of RLE. NT  3. Patient will be able to run for 10 minutes without pain to improve ability to perform recreational activities. MET, running on TM x 10 mins without p!      PLAN  []  Upgrade activities as tolerated yes Continue plan of care   []  Discharge due to :    []  Other:      Therapist: Livia Luis PTA    Date: 3/17/2017 Time: 4:44 PM     Future Appointments  Date Time Provider Sarahy Lopez   [facility]   3/24/2017 11:00 AM Fabio Mal[facility]   3/27/2017 8:00 AM Livia Luis PTA [facility]   3/31/2017 8:30 AM Livia Luis PTA [facility]

## 2017-03-17 NOTE — PROGRESS NOTES
[address] [facility] PHYSICAL THERAPY  [address] [facility] [address] [place], [address] - Phone: [phone]  Fax: [phone]  PROGRESS NOTE  Patient Name: Lissette Ortiz : 1985   Treatment/Medical Diagnosis: Left knee pain [M25.562]   Referral Source: Deanna Chan MD     Date of Initial Visit: 16 Attended Visits: 18 Missed Visits: 3     SUMMARY OF TREATMENT  Pt's rx consist of an active warm up on stair master, LE/core strengthening/stability/stretching TE, proprioception/balance TE, stair/squat training, min plyometrics/running/jogging and instruction on HEP + proper body mechanics  CURRENT STATUS  Pt progressing well despite the lapse in therapy from 17-3/15/17. Pt initiated run:jog program and is able to jog on TM x 10' without increase p!. Pt able to demo p! free proper 90 deg knee flex squat. Goal/Measure of Progress Goal Met? 1. Patient will improve FOTO score by 20 points to indicate improved functional ability    Status at last Eval: 68 Current Status: NT progressing   2. Patient will achieve Hop test to >/= 75% of RLE. Status at last Eval: NT Current Status: NT NA   3. Patient will be able to run for 10 minutes without pain to improve ability to perform recreational activities   Status at last Eval: NT Current Status: jogs x 10 min p! free yes     New Goals to be achieved in __2-4_  weeks:  1. 1. Patient will improve FOTO score by 20 points to indicate improved functional ability  2. Pt to initiate and promote (I) with [employer] Jumping program.  3. Patient will achieve Hop test to >/= 75% of RLE. RECOMMENDATIONS  Pt to initiate ambriz jumping program; therefore will benefit from further skilled PT services 2x wk x 2-4 wks to increase strength/stability to promote functional mobility and return to PLOF. If you have any questions/comments please contact us directly at [phone].    Thank you for allowing us to assist in the care of your patient. LPTA Signature: Adrien Covington, PTA  Date: 3/17/2017   PT Signature: Chayito Lou DPT Time: 12:40 PM   NOTE TO PHYSICIAN:  PLEASE COMPLETE THE ORDERS BELOW AND FAX TO   [facility] Physical Therapy: [phone]. If you are unable to process this request in 24 hours please contact our office: [phone].    ___ I have read the above report and request that my patient continue as recommended.   ___ I have read the above report and request that my patient continue therapy with the following changes/special instructions:_________________________________________________________   ___ I have read the above report and request that my patient be discharged from therapy.      Physician Signature:        Date:       Time:

## 2017-03-20 ENCOUNTER — APPOINTMENT (OUTPATIENT)
Dept: PHYSICAL THERAPY | Age: 32
End: 2017-03-20
Payer: COMMERCIAL

## 2017-03-22 ENCOUNTER — APPOINTMENT (OUTPATIENT)
Dept: PHYSICAL THERAPY | Age: 32
End: 2017-03-22
Payer: COMMERCIAL

## 2017-03-24 ENCOUNTER — HOSPITAL ENCOUNTER (OUTPATIENT)
Dept: PHYSICAL THERAPY | Age: 32
Discharge: HOME | End: 2017-03-24
Payer: COMMERCIAL

## 2017-03-24 PROCEDURE — 97530 THERAPEUTIC ACTIVITIES: CPT

## 2017-03-24 PROCEDURE — 97110 THERAPEUTIC EXERCISES: CPT

## 2017-03-24 NOTE — PROGRESS NOTES
PHYSICAL THERAPY - DAILY TREATMENT NOTE      Patient Name: Lisa Virk        Date: 3/24/2017  : 1985   YES Patient  Verified  Visit #:     Insurance: Payor: Sheyla Milligan / [address]: [address] / Product Type: PPO /      In time: 11:08 Out time: 11:50   Total Treatment Time: 42       TREATMENT AREA =  Left knee pain [M25.562]    SUBJECTIVE    Pain Level (on 0 to 10 scale):  0  / 10   Medication Changes/New allergies or changes in medical history, any new surgeries or procedures? NO    If yes, update Summary List   Subjective Functional Status/Changes:  []  No changes reported     \"I'm getting over a cold. 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 Did demo significant fatigue following session most likely secondary to having cold the past week with minimal activity level.      []  See Progress Note/Recertification   Patient will continue to benefit from skilled PT services to modify and progress therapeutic interventions, address functional mobility deficits, address ROM deficits, address strength deficits, analyze and address soft tissue restrictions, analyze and cue movement patterns, analyze and modify body mechanics/ergonomics, assess and modify postural abnormalities and instruct in home and community integration to attain remaining goals. Progress toward goals / Updated goals:    1. 1. Patient will improve FOTO score by 20 points to indicate improved functional ability  2. Pt to initiate and promote (I) with [employer] Jumping program. [employer] jumping program  3. Patient will achieve Hop test to >/= 75% of RLE.        PLAN    [x]  Upgrade activities as tolerated YES Continue plan of care   []  Discharge due to :    []  Other:      Therapist: Vandana Mckeon DPT    Date: 3/24/2017 Time: 10:58 AM     Future Appointments  Date Time Provider Sarahy Lopez   3/24/2017 11:00 AM Ana Marin [facility]   3/27/2017 8:00 AM Edin Carter [facility]   3/31/2017 8:30 AM Edin Carter [facility]

## 2017-03-27 ENCOUNTER — HOSPITAL ENCOUNTER (OUTPATIENT)
Dept: PHYSICAL THERAPY | Age: 32
Discharge: HOME | End: 2017-03-27
Payer: COMMERCIAL

## 2017-03-27 PROCEDURE — 97110 THERAPEUTIC EXERCISES: CPT

## 2017-03-27 PROCEDURE — 97530 THERAPEUTIC ACTIVITIES: CPT

## 2017-03-27 PROCEDURE — 97112 NEUROMUSCULAR REEDUCATION: CPT

## 2017-03-27 NOTE — PROGRESS NOTES
PHYSICAL THERAPY - DAILY TREATMENT NOTE      Patient Name: Mary Kate Dobbins        Date: 3/27/2017  : 1985   YES Patient  Verified  Visit #:     Insurance: Payor: Darryle Blander / [address]: [address] / Product Type: PPO /      In time: 8:00 Out time: 8:40   Total Treatment Time: 40 min       TREATMENT AREA =  Left knee pain [M25.562]    SUBJECTIVE    Pain Level (on 0 to 10 scale):  0  / 10   Medication Changes/New allergies or changes in medical history, any new surgeries or procedures? NO    If yes, update Summary List   Subjective Functional Status/Changes:  []  No changes reported     Patient stated, \"I felt OK after last time. \"        OBJECTIVE    12 min Therapeutic Exercise:  [x]  See flow sheet   Rationale:      increase ROM, increase strength, improve coordination, improve balance and increase proprioception to improve the patients ability to increase pt's stability/mobility and improve functional activity and ability to perform ADL's    8 min Neuromuscular Re-ed: Alt toe taps on ball multiple repetitions, wall jumps   Rationale: To improve ability to safely return to soccer with decreased risk of re-injury    25 min Therapeutic Activity: Canela Jumping program   Rationale:     decrease pain, increase ROM, increase tissue extensibility and increase postural awareness to improve patient's ability to ensure safe return to soccer and recreational activities.     1 min Patient Education:  YES  Reviewed HEP   []  Progressed/Changed HEP based on:   Provided Week 2 Canela program as HEP     Other Objective/Functional Measures:    Initiated Phase I Week 2 of Canela Jumping program  VC required for softer landing during all Canela exercises  Continues to demo inc cardiovacular fatigue requiring frequent rest breaks during jumping program     Post Treatment Pain Level (on 0 to 10) scale:   0  / 10     ASSESSMENT    Assessment/Changes in Function:     Patient will schedule additional visits 1x/week in April to complete [employer] jumping program.     []  See Progress Note/Recertification   Patient will continue to benefit from skilled PT services to modify and progress therapeutic interventions, address functional mobility deficits, address ROM deficits, address strength deficits, analyze and address soft tissue restrictions, analyze and cue movement patterns, analyze and modify body mechanics/ergonomics, assess and modify postural abnormalities, address imbalance/dizziness and instruct in home and community integration to attain remaining goals. Progress toward goals / Updated goals:    New Goals to be achieved in __2-4_ weeks:  1. 1. Patient will improve FOTO score by 20 points to indicate improved functional ability  2. Pt to initiate and promote (I) with [employer] Jumping program. Progressed to week 2 of Phase I  3. Patient will achieve Hop test to >/= 75% of RLE.        PLAN    [x]  Upgrade activities as tolerated YES Continue plan of care   []  Discharge due to :    []  Other:      Therapist: Trudi Poe DPT    Date: 3/27/2017 Time: 7:57 AM     Future Appointments  Date Time Provider Sarahy Lopez   3/27/2017 8:00 AM Lizzy Manrique [facility]   3/31/2017 8:30 AM Sona Ramos PTA [facility]

## 2017-03-29 ENCOUNTER — APPOINTMENT (OUTPATIENT)
Dept: PHYSICAL THERAPY | Age: 32
End: 2017-03-29
Payer: COMMERCIAL

## 2017-03-31 ENCOUNTER — APPOINTMENT (OUTPATIENT)
Dept: PHYSICAL THERAPY | Age: 32
End: 2017-03-31
Payer: COMMERCIAL

## 2017-08-11 NOTE — PROGRESS NOTES
[address] [facility] [place] PHYSICAL THERAPY  [address] [facility] [address] [place], [address] - Phone: [phone]  Fax: [phone] SUMMARY  Patient Name: Karan Craven : 1985   Treatment/Medical Diagnosis: Left knee pain [M25.562]   Referral Source: Koffi Walker MD     Date of Initial Visit: 16 Attended Visits: 20 Missed Visits: 5     SUMMARY OF TREATMENT  Pt's rx consist of an active warm up on stair master, LE/core strengthening/stability/stretching TE, proprioception/balance TE, stair/squat training, min plyometrics/running/jogging and instruction on HEP + proper body mechanics    CURRENT STATUS  Patient requests D/C from PT at this time. He progressed very well through plyometric/jumping program, is pain free with jogging/running and is independent with HEP.     Goal/Measure of Progress Goal Met?    1. Patient will improve FOTO score by 20 points to indicate improved functional ability  2. Pt to initiate and promote (I) with [employer] Jumping program.  3. Patient will achieve Hop test to >/= 75% of RLE. 1. MET    2. Progressed    3. Unable to assess        G-Codes: n/a    RECOMMENDATIONS  Discontinue therapy. Progressing towards or have reached established goals. If you have any questions/comments please contact us directly at [phone]. Thank you for allowing us to assist in the care of your patient. Therapist Signature: Issa Carballo DPT Date: 17   Reporting Period: 3/17/17 - 3/27/17 Time: 12:08 PM     ===========================================================================================  I certify that the above Physical Therapy Services are being furnished while the patient is under my care. I agree with the treatment plan and certify that this therapy is necessary. Physician Signature:        Date:       Time:     Please sign and return to [employer] or you may fax the signed copy to [phone]. Thank you.

## 2022-12-26 NOTE — PROGRESS NOTES
PHYSICAL THERAPY - DAILY TREATMENT NOTE      Patient Name: Karan Craven        Date: 2017  : 1985   YES Patient  Verified  Visit #:     Insurance: Payor: Mariann Kelley / Plan: [address] [facility] [address] / Product Type: PPO /      In time: 11:35 Out time: 12:25   Total Treatment Time: 50       TREATMENT AREA =  Left knee pain [M25.562]    SUBJECTIVE    Pain Level (on 0 to 10 scale):  0  / 10   Medication Changes/New allergies or changes in medical history, any new surgeries or procedures? NO    If yes, update Summary List   Subjective Functional Status/Changes:  []  No changes reported     Patient reports he rode his road bike for 8 miles and minimal discomfort in the knee. OBJECTIVE    40 min Therapeutic Exercise:  [x]  See flow sheet   Rationale:      increase ROM, increase strength, improve coordination, improve balance and increase proprioception to improve the patients ability to increase pt's stability/mobility and improve functional activity and ability to perform ADL's    10 min Neuromuscular Re-ed: BOSU step ups, BOSU lunge alternating, BOSU squat    Rationale: increase strength, improve coordination, improve balance and increase proprioception to improve the patients movement patterns and LE strength    1 min Patient Education:  YES  Reviewed HEP   []  Progressed/Changed HEP based on: Other Objective/Functional Measures: Added sled pulls 75# x 4 trials forward/backward 30' each. (S) required for BOSU squats  Progressed lat tap downs to 4\" box     BUE support required for SL HE  Significant LOB several trials with inverted HS     Post Treatment Pain Level (on 0 to 10) scale:   0  / 10     ASSESSMENT    Assessment/Changes in Function:     Progressed therex well without complaints or inc knee pain. Will continue focus on quad strengthening and cueing for proper movement patterns.      []  See Progress Note/Recertification   Patient will continue to benefit from skilled PT services to modify and progress therapeutic interventions, address functional mobility deficits, address ROM deficits, address strength deficits, analyze and address soft tissue restrictions, analyze and cue movement patterns, analyze and modify body mechanics/ergonomics, assess and modify postural abnormalities, address imbalance/dizziness and instruct in home and community integration to attain remaining goals. Progress toward goals / Updated goals:    Patient progressing towards LTGs.      PLAN    [x]  Upgrade activities as tolerated YES Continue plan of care   []  Discharge due to :    []  Other:      Therapist: Jennifer Haines DPT    Date: 1/27/2017 Time: 11:45 AM     Future Appointments  Date Time Provider Sarahy Lopez   2/1/2017 4:30 PM [facility]   2/8/2017 4:30 PM [facility]   2/10/2017 9:30 AM [facility]   2/15/2017 4:30 PM [facility]   2/22/2017 4:30 PM [facility]   2/24/2017 11:00 AM [facility] numerical 0-10